# Patient Record
Sex: FEMALE | Race: WHITE | Employment: OTHER | URBAN - METROPOLITAN AREA
[De-identification: names, ages, dates, MRNs, and addresses within clinical notes are randomized per-mention and may not be internally consistent; named-entity substitution may affect disease eponyms.]

---

## 2017-07-19 PROBLEM — B96.81 HELICOBACTER PYLORI GASTRITIS: Status: ACTIVE | Noted: 2017-07-19

## 2017-07-19 PROBLEM — M81.0 OSTEOPOROSIS, UNSPECIFIED OSTEOPOROSIS TYPE, UNSPECIFIED PATHOLOGICAL FRACTURE PRESENCE: Status: ACTIVE | Noted: 2017-07-19

## 2017-07-19 PROBLEM — K29.70 HELICOBACTER PYLORI GASTRITIS: Status: ACTIVE | Noted: 2017-07-19

## 2018-08-08 PROBLEM — Z12.83 SCREENING EXAM FOR SKIN CANCER: Status: ACTIVE | Noted: 2018-08-08

## 2018-08-08 PROBLEM — M81.8 OTHER OSTEOPOROSIS WITHOUT CURRENT PATHOLOGICAL FRACTURE: Status: ACTIVE | Noted: 2017-07-19

## 2018-08-08 PROBLEM — Z83.719 FAMILY HISTORY OF COLONIC POLYPS: Status: ACTIVE | Noted: 2018-08-08

## 2018-08-08 PROBLEM — Z83.71 FAMILY HISTORY OF COLONIC POLYPS: Status: ACTIVE | Noted: 2018-08-08

## 2018-08-08 PROBLEM — E04.1 THYROID NODULE: Status: ACTIVE | Noted: 2018-08-08

## 2018-08-08 PROCEDURE — 87624 HPV HI-RISK TYP POOLED RSLT: CPT | Performed by: INTERNAL MEDICINE

## 2018-08-08 PROCEDURE — 88175 CYTOPATH C/V AUTO FLUID REDO: CPT | Performed by: INTERNAL MEDICINE

## 2018-08-08 PROCEDURE — 86480 TB TEST CELL IMMUN MEASURE: CPT | Performed by: INTERNAL MEDICINE

## 2018-08-08 PROCEDURE — 36415 COLL VENOUS BLD VENIPUNCTURE: CPT | Performed by: INTERNAL MEDICINE

## 2019-06-14 PROBLEM — S92.302A CLOSED NONDISPLACED FRACTURE OF METATARSAL BONE OF LEFT FOOT, UNSPECIFIED METATARSAL, INITIAL ENCOUNTER: Status: ACTIVE | Noted: 2019-06-14

## 2021-05-17 PROCEDURE — 88305 TISSUE EXAM BY PATHOLOGIST: CPT | Performed by: RADIOLOGY

## 2022-02-01 PROBLEM — T83.32XA IUD STRINGS LOST: Status: ACTIVE | Noted: 2022-02-01

## 2024-06-19 RX ORDER — MELATONIN
1000 DAILY
COMMUNITY

## 2024-06-19 RX ORDER — CALCIUM CARBONATE 500 MG/1
1 TABLET, CHEWABLE ORAL DAILY
COMMUNITY

## 2024-06-19 RX ORDER — HYDROCODONE BITARTRATE AND ACETAMINOPHEN 5; 325 MG/1; MG/1
1 TABLET ORAL EVERY 4 HOURS PRN
Qty: 20 TABLET | Refills: 0 | Status: SHIPPED | OUTPATIENT
Start: 2024-06-21

## 2024-06-20 NOTE — DISCHARGE INSTRUCTIONS
My goal is to provide excellent care and a positive patient experience.  Please help me to share your experience with others by leaving a review for me on [email or web address], [email or web address], and/or Liibook.  Thank you for your help.    Go to google.com and search Nancy Whitley.  Click on write a review.  Sign in to google.  (You will not receive any spam or ads from this site and your email will not be posted).  Click on the stars to give a star rating and leave a comment.     Go to [email or web address] and search [employer].  Click on De[employer]r.  Click on the stars to give a star rating and leave a comment.  Enter your email address (You will not receive any spam or ads from this site and your email will not be posted).  Click the box to confirm that you are a patient and post your review.    Go to [email or web address].com and search [employer].  Click on De[employer].  Click on the stars to give a star rating and leave a comment.  Enter your email address (You will not receive any spam or ads from this site and your email will not be posted).  Submit your review.        Nancy Whitley MD      Hand Surgery Post-Operative Instructions    PAIN:  You will be given a prescription for pain medicine.  Have this filled at your local pharmacy or where your insurance plan has made arrangements.  The pills may be taken every four hours for pain if needed.  Do not drive while you are taking the pain medication.    ACTIVITY:  Elevate the surgical area as much as possible for the first three days.  You should bend and straighten your fingers, wrist, elbow, and shoulder often and as much as you desire, unless it is restricted by a splint.  It is safe to write and eat with your operated arm as long as there is no pain.  Do not carry anything heavier than a dinner fork or pencil with your operated arm.    SLING:  A sling may be worn if needed for comfort.  If you had a nerve block, you should use the sling until you regain  sensation and strength to your arm.  It is better to elevate the surgical area above your heart, rather than hanging it below your heart in the sling.    ICE:  Apply ice to the surgical area for 1 hour, 4 times a day for 3 days after surgery.  This will help to reduce swelling and pain.  After that you may apply ice as much as you like.    WOUNDS:  Your stitches may need to be removed in 2 weeks.  The incision may be sore, swell, and develop bruising over the next several days.  This will go away and no special care is required.  Your skin may be dry and peel due to the strong disinfectant soap we use at the time of surgery.    BATHING:   ___x___ You should keep your splint clean and dry, but may loosen the outer ACE wrap if it feels tight.     FOLLOW-UP:  Call the office to make an appointment to see me in about 2 weeks after your surgery.  If you have a temperature over 101°F, severe pain, or redness at the incision site; please contact the office.  You may be asked to come back to the office early.       HOME INSTRUCTIONS  AMBSURG HOME CARE INSTRUCTIONS: POST-OP ANESTHESIA  The medication that you received for sedation or general anesthesia can last up to 24 hours. Your judgment and reflexes may be altered, even if you feel like your normal self.      We Recommend:   Do not drive any motor vehicle or bicycle   Avoid mowing the lawn, playing sports, or working with power tools/applicances (power saws, electric knives or mixers)   That you have someone stay with you on your first night home   Do not drink alcohol or take sleeping pills or tranquilizers   Do not sign legal documents within 24 hours of your procedure   If you had a nerve block for your surgery, take extra care not to put any pressure on your arm or hand for 24 hours    It is normal:  For you to have a sore throat if you had a breathing tube during surgery (while you were asleep!). The sore throat should get better within 48 hours. You can gargle with  warm salt water (1/2 tsp in 4 oz warm water) or use a throat lozenge for comfort  To feel muscle aches or soreness especially in the abdomen, chest or neck. The achy feeling should go away in the next 24 hours  To feel weak, sleepy or \"wiped out\". Your should start feeling better in the next 24 hours.   To experience mild discomforts such as sore lip or tongue, headache, cramps, gas pains or a bloated feeling in your abdomen.   To experience mild back pain or soreness for a day or two if you had spinal or epidural anesthesia.   If you had laparoscopic surgery, to feel shoulder pain or discomfort on the day of surgery.   For some patients to have nausea after surgery/anesthesia    If you feel nausea or experience vomiting:   Try to move around less.   Eat less than usual or drink only liquids until the next morning   Nausea should resolve in about 24 hours    If you have a problem when you are at home:    Call your surgeons office   Discharge Instructions: After Your Surgery  You’ve just had surgery. During surgery, you were given medicine called anesthesia to keep you relaxed and free of pain. After surgery, you may have some pain or nausea. This is common. Here are some tips for feeling better and getting well after surgery.   Going home  Your healthcare provider will show you how to take care of yourself when you go home. They'll also answer your questions. Have an adult family member or friend drive you home. For the first 24 hours after your surgery:   Don't drive or use heavy equipment.  Don't make important decisions or sign legal papers.  Take medicines as directed.  Don't drink alcohol.  Have someone stay with you, if needed. They can watch for problems and help keep you safe.  Be sure to go to all follow-up visits with your healthcare provider. And rest after your surgery for as long as your provider tells you to.   Coping with pain  If you have pain after surgery, pain medicine will help you feel better.  Take it as directed, before pain becomes severe. Also, ask your healthcare provider or pharmacist about other ways to control pain. This might be with heat, ice, or relaxation. And follow any other instructions your surgeon or nurse gives you.      Stay on schedule with your medicine.     Tips for taking pain medicine  To get the best relief possible, remember these points:   Pain medicines can upset your stomach. Taking them with a little food may help.  Most pain relievers taken by mouth need at least 20 to 30 minutes to start to work.  Don't wait till your pain becomes severe before you take your medicine. Try to time your medicine so that you can take it before starting an activity. This might be before you get dressed, go for a walk, or sit down for dinner.  Constipation is a common side effect of some pain medicines. Call your healthcare provider before taking any medicines such as laxatives or stool softeners to help ease constipation. Also ask if you should skip any foods. Drinking lots of fluids and eating foods such as fruits and vegetables that are high in fiber can also help. Remember, don't take laxatives unless your surgeon has prescribed them.  Drinking alcohol and taking pain medicine can cause dizziness and slow your breathing. It can even be deadly. Don't drink alcohol while taking pain medicine.  Pain medicine can make you react more slowly to things. Don't drive or run machinery while taking pain medicine.  Your healthcare provider may tell you to take acetaminophen to help ease your pain. Ask them how much you're supposed to take each day. Acetaminophen or other pain relievers may interact with your prescription medicines or other over-the-counter (OTC) medicines. Some prescription medicines have acetaminophen and other ingredients in them. Using both prescription and OTC acetaminophen for pain can cause you to accidentally overdose. Read the labels on your OTC medicines with care. This will  help you to clearly know the list of ingredients, how much to take, and any warnings. It may also help you not take too much acetaminophen. If you have questions or don't understand the information, ask your pharmacist or healthcare provider to explain it to you before you take the OTC medicine.   Managing nausea  Some people have an upset stomach (nausea) after surgery. This is often because of anesthesia, pain, or pain medicine, less movement of food in the stomach, or the stress of surgery. These tips will help you handle nausea and eat healthy foods as you get better. If you were on a special food plan before surgery, ask your healthcare provider if you should follow it while you get better. Check with your provider on how your eating should progress. It may depend on the surgery you had. These general tips may help:   Don't push yourself to eat. Your body will tell you when to eat and how much.  Start off with clear liquids and soup. They're easier to digest.  Next try semi-solid foods as you feel ready. These include mashed potatoes, applesauce, and gelatin.  Slowly move to solid foods. Don’t eat fatty, rich, or spicy foods at first.  Don't force yourself to have 3 large meals a day. Instead eat smaller amounts more often.  Take pain medicines with a small amount of solid food, such as crackers or toast. This helps prevent nausea.  When to call your healthcare provider  Call your healthcare provider right away if you have any of these:   You still have too much pain, or the pain gets worse, after taking the medicine. The medicine may not be strong enough. Or there may be a complication from the surgery.  You feel too sleepy, dizzy, or groggy. The medicine may be too strong.  Side effects such as nausea or vomiting. Your healthcare provider may advise taking other medicines to .  Skin changes such as rash, itching, or hives. This may mean you have an allergic reaction. Your provider may advise taking other  medicines.  The incision looks different (for instance, part of it opens up).  Bleeding or fluid leaking from the incision site, and weren't told to expect that.  Fever of 100.4°F (38°C) or higher, or as directed by your provider.  Call 911  Call 911 right away if you have:   Trouble breathing  Facial swelling    If you have obstructive sleep apnea   You were given anesthesia medicine during surgery to keep you comfortable and free of pain. After surgery, you may have more apnea spells because of this medicine and other medicines you were given. The spells may last longer than normal.    At home:  Keep using the continuous positive airway pressure (CPAP) device when you sleep. Unless your healthcare provider tells you not to, use it when you sleep, day or night. CPAP is a common device used to treat obstructive sleep apnea.  Talk with your provider before taking any pain medicine, muscle relaxants, or sedatives. Your provider will tell you about the possible dangers of taking these medicines.  Contact your provider if your sleeping changes a lot even when taking medicines as directed.  Kamila last reviewed this educational content on 10/1/2021  © 3411-4988 [employer]. All rights reserved. This information is not intended as a substitute for professional medical care. Always follow your healthcare professional's instructions.

## 2024-06-21 ENCOUNTER — ANESTHESIA (OUTPATIENT)
Dept: SURGERY | Facility: HOSPITAL | Age: 59
End: 2024-06-21

## 2024-06-21 ENCOUNTER — HOSPITAL ENCOUNTER (OUTPATIENT)
Facility: HOSPITAL | Age: 59
Setting detail: HOSPITAL OUTPATIENT SURGERY
Discharge: HOME | End: 2024-06-21
Attending: ORTHOPAEDIC SURGERY | Admitting: ORTHOPAEDIC SURGERY

## 2024-06-21 ENCOUNTER — ANESTHESIA EVENT (OUTPATIENT)
Dept: SURGERY | Facility: HOSPITAL | Age: 59
End: 2024-06-21

## 2024-06-21 VITALS
HEIGHT: 65 IN | RESPIRATION RATE: 12 BRPM | DIASTOLIC BLOOD PRESSURE: 82 MMHG | OXYGEN SATURATION: 98 % | TEMPERATURE: 99 F | WEIGHT: 132 LBS | SYSTOLIC BLOOD PRESSURE: 135 MMHG | BODY MASS INDEX: 21.99 KG/M2 | HEART RATE: 77 BPM

## 2024-06-21 DIAGNOSIS — S52.572A OTHER CLOSED INTRA-ARTICULAR FRACTURE OF DISTAL END OF LEFT RADIUS, INITIAL ENCOUNTER: Primary | ICD-10-CM

## 2024-06-21 PROCEDURE — 0PSJ04Z REPOSITION LEFT RADIUS WITH INTERNAL FIXATION DEVICE, OPEN APPROACH: ICD-10-PCS | Performed by: ORTHOPAEDIC SURGERY

## 2024-06-21 PROCEDURE — 64415 NJX AA&/STRD BRCH PLXS IMG: CPT | Performed by: ORTHOPAEDIC SURGERY

## 2024-06-21 DEVICE — IMPLANTABLE DEVICE: Type: IMPLANTABLE DEVICE | Site: WRIST | Status: FUNCTIONAL

## 2024-06-21 DEVICE — SCREW BNE 3.5X13MM CORT DST RAD TI LOK: Type: IMPLANTABLE DEVICE | Site: WRIST | Status: FUNCTIONAL

## 2024-06-21 DEVICE — SCREW BNE 3.5X11MM CORT DST VOLAR RAD: Type: IMPLANTABLE DEVICE | Site: WRIST | Status: FUNCTIONAL

## 2024-06-21 DEVICE — K WIRE 1.5X127MM DST RAD SYS GEMINUS: Type: IMPLANTABLE DEVICE | Site: WRIST | Status: FUNCTIONAL

## 2024-06-21 DEVICE — SCREW BNE 3.5X10MM DST RAD CORT LOK TI: Type: IMPLANTABLE DEVICE | Site: WRIST | Status: FUNCTIONAL

## 2024-06-21 RX ORDER — SODIUM CHLORIDE, SODIUM LACTATE, POTASSIUM CHLORIDE, CALCIUM CHLORIDE 600; 310; 30; 20 MG/100ML; MG/100ML; MG/100ML; MG/100ML
INJECTION, SOLUTION INTRAVENOUS CONTINUOUS
Status: DISCONTINUED | OUTPATIENT
Start: 2024-06-21 | End: 2024-06-21

## 2024-06-21 RX ORDER — DIPHENHYDRAMINE HYDROCHLORIDE 50 MG/ML
INJECTION INTRAMUSCULAR; INTRAVENOUS AS NEEDED
Status: DISCONTINUED | OUTPATIENT
Start: 2024-06-21 | End: 2024-06-21 | Stop reason: SURG

## 2024-06-21 RX ORDER — CEFAZOLIN SODIUM 1 G/3ML
INJECTION, POWDER, FOR SOLUTION INTRAMUSCULAR; INTRAVENOUS AS NEEDED
Status: DISCONTINUED | OUTPATIENT
Start: 2024-06-21 | End: 2024-06-21 | Stop reason: SURG

## 2024-06-21 RX ORDER — ACETAMINOPHEN 500 MG
1000 TABLET ORAL ONCE
Status: COMPLETED | OUTPATIENT
Start: 2024-06-21 | End: 2024-06-21

## 2024-06-21 RX ORDER — HYDROMORPHONE HYDROCHLORIDE 1 MG/ML
0.2 INJECTION, SOLUTION INTRAMUSCULAR; INTRAVENOUS; SUBCUTANEOUS EVERY 5 MIN PRN
Status: DISCONTINUED | OUTPATIENT
Start: 2024-06-21 | End: 2024-06-21

## 2024-06-21 RX ORDER — NALOXONE HYDROCHLORIDE 0.4 MG/ML
0.08 INJECTION, SOLUTION INTRAMUSCULAR; INTRAVENOUS; SUBCUTANEOUS AS NEEDED
Status: DISCONTINUED | OUTPATIENT
Start: 2024-06-21 | End: 2024-06-21

## 2024-06-21 RX ORDER — MIDAZOLAM HYDROCHLORIDE 1 MG/ML
INJECTION INTRAMUSCULAR; INTRAVENOUS
Status: COMPLETED | OUTPATIENT
Start: 2024-06-21 | End: 2024-06-21

## 2024-06-21 RX ORDER — HYDROMORPHONE HYDROCHLORIDE 1 MG/ML
0.4 INJECTION, SOLUTION INTRAMUSCULAR; INTRAVENOUS; SUBCUTANEOUS EVERY 5 MIN PRN
Status: DISCONTINUED | OUTPATIENT
Start: 2024-06-21 | End: 2024-06-21

## 2024-06-21 RX ORDER — DEXAMETHASONE SODIUM PHOSPHATE 4 MG/ML
VIAL (ML) INJECTION AS NEEDED
Status: DISCONTINUED | OUTPATIENT
Start: 2024-06-21 | End: 2024-06-21 | Stop reason: SURG

## 2024-06-21 RX ORDER — ONDANSETRON 2 MG/ML
INJECTION INTRAMUSCULAR; INTRAVENOUS AS NEEDED
Status: DISCONTINUED | OUTPATIENT
Start: 2024-06-21 | End: 2024-06-21 | Stop reason: SURG

## 2024-06-21 RX ORDER — MORPHINE SULFATE 4 MG/ML
4 INJECTION, SOLUTION INTRAMUSCULAR; INTRAVENOUS EVERY 10 MIN PRN
Status: DISCONTINUED | OUTPATIENT
Start: 2024-06-21 | End: 2024-06-21

## 2024-06-21 RX ORDER — ROPIVACAINE HYDROCHLORIDE 5 MG/ML
INJECTION, SOLUTION EPIDURAL; INFILTRATION; PERINEURAL
Status: COMPLETED | OUTPATIENT
Start: 2024-06-21 | End: 2024-06-21

## 2024-06-21 RX ORDER — MORPHINE SULFATE 4 MG/ML
2 INJECTION, SOLUTION INTRAMUSCULAR; INTRAVENOUS EVERY 10 MIN PRN
Status: DISCONTINUED | OUTPATIENT
Start: 2024-06-21 | End: 2024-06-21

## 2024-06-21 RX ORDER — MORPHINE SULFATE 10 MG/ML
6 INJECTION, SOLUTION INTRAMUSCULAR; INTRAVENOUS EVERY 10 MIN PRN
Status: DISCONTINUED | OUTPATIENT
Start: 2024-06-21 | End: 2024-06-21

## 2024-06-21 RX ORDER — HYDROMORPHONE HYDROCHLORIDE 1 MG/ML
0.6 INJECTION, SOLUTION INTRAMUSCULAR; INTRAVENOUS; SUBCUTANEOUS EVERY 5 MIN PRN
Status: DISCONTINUED | OUTPATIENT
Start: 2024-06-21 | End: 2024-06-21

## 2024-06-21 RX ADMIN — DIPHENHYDRAMINE HYDROCHLORIDE 12.5 MG: 50 INJECTION INTRAMUSCULAR; INTRAVENOUS at 11:35:00

## 2024-06-21 RX ADMIN — CEFAZOLIN SODIUM 2 G: 1 INJECTION, POWDER, FOR SOLUTION INTRAMUSCULAR; INTRAVENOUS at 11:35:00

## 2024-06-21 RX ADMIN — MIDAZOLAM HYDROCHLORIDE 2 MG: 1 INJECTION INTRAMUSCULAR; INTRAVENOUS at 11:24:00

## 2024-06-21 RX ADMIN — DEXAMETHASONE SODIUM PHOSPHATE 8 MG: 4 MG/ML VIAL (ML) INJECTION at 11:53:00

## 2024-06-21 RX ADMIN — ONDANSETRON 4 MG: 2 INJECTION INTRAMUSCULAR; INTRAVENOUS at 11:53:00

## 2024-06-21 RX ADMIN — ROPIVACAINE HYDROCHLORIDE 30 ML: 5 INJECTION, SOLUTION EPIDURAL; INFILTRATION; PERINEURAL at 11:24:00

## 2024-06-21 NOTE — BRIEF OP NOTE
Pre-Operative Diagnosis: Left wrist pain, other closed intra articular fracture of distal end of left radius, initial encounter     Post-Operative Diagnosis: Left wrist pain, other closed intra articular fracture of distal end of left radius, initial encounter      Procedure Performed:   Left distal radius open reduction internal fixation    Surgeons and Role:     * Nancy Whitley MD - Primary    Assistant(s):        Surgical Findings: reduced, stable     Specimen: none     Estimated Blood Loss: minimal    Dictation Number:  #[id number]    Nancy Whitley MD  6/21/2024  1:08 PM

## 2024-06-21 NOTE — ANESTHESIA PREPROCEDURE EVALUATION
Anesthesia PreOp Note    HPI:     Yanci Mauricio is a 58 year old female who presents for preoperative consultation requested by: Nancy Whitley MD    Date of Surgery: 2024    Procedure(s):  Left distal radius open reduction internal fixation  Indication: Left wrist pain, other closed intra articular fracture of distal end of left radius, initial encounter    Relevant Problems   No relevant active problems       NPO:  Last Liquid Consumption Date: 24  Last Liquid Consumption Time:   Last Solid Consumption Date: 24  Last Solid Consumption Time:   Last Liquid Consumption Date: 24          History Review:  Patient Active Problem List    Diagnosis Date Noted    IUD strings lost 2022    Closed nondisplaced fracture of metatarsal bone of left foot, unspecified metatarsal, initial encounter 2019    Family history of colonic polyps 2018    Screening exam for skin cancer 2018    Thyroid nodule 2018    Other osteoporosis without current pathological fracture 2017    Helicobacter pylori gastritis 2017       Past Medical History:    Anxiety    Visual impairment    contacts/glasses       Past Surgical History:   Procedure Laterality Date    Colonoscopy  2021    one small SS polyp, one small polyp with normal tissue, int hem, rpt     Colonoscopy,diagnostic N/A 2016    Procedure: ESOPHAGOGASTRODUODENOSCOPY, COLONOSCOPY, POSSIBLE BIOPSY, POSSIBLE POLYPECTOMY 12864, 78061;  Surgeon: Raf Griggs MD;  Location: [facility]    Needle biopsy left  2021    Breast tissue with stromal fibrosis, apocrine metaplasia, microcysts, and few microcalcifications          Upper gi endoscopy,biopsy N/A 2016    Procedure: ESOPHAGOGASTRODUODENOSCOPY, COLONOSCOPY, POSSIBLE BIOPSY, POSSIBLE POLYPECTOMY 62954, 31917;  Surgeon: Raf Griggs MD;  Location: [facility]       Medications Prior to Admission   Medication Sig  Dispense Refill Last Dose    cholecalciferol 25 MCG (1000 UT) Oral Tab Take 1 tablet (1,000 Units total) by mouth daily.   6/20/2024 at 0800    calcium carbonate 500 MG Oral Chew Tab Chew 1 tablet (500 mg total) by mouth daily.   6/20/2024 at 0800    Escitalopram Oxalate (LEXAPRO) 10 MG Oral Tab Take 1 tablet (10 mg total) by mouth daily.  3 6/20/2024 at 0800    QUEtiapine Fumarate (SEROQUEL) 25 MG Oral Tab Take 2 tablets (50 mg total) by mouth nightly.  3 6/20/2024 at 2100     Current Facility-Administered Medications Ordered in Epic   Medication Dose Route Frequency Provider Last Rate Last Admin    lactated ringers infusion   Intravenous Continuous Nancy Whitley MD 20 mL/hr at 06/21/24 1011 New Bag at 06/21/24 1011    ceFAZolin (Ancef) 2g in 10mL IV syringe premix  2 g Intravenous Once Nancy Whitley MD         Current Outpatient Medications Ordered in Epic   Medication Sig Dispense Refill    HYDROcodone-acetaminophen 5-325 MG Oral Tab Take 1 tablet by mouth every 4 (four) hours as needed. 20 tablet 0       No Known Allergies    Family History   Problem Relation Age of Onset    Hypertension Father     Cancer Father         prostate cancer    Heart Disorder Mother         afib    Other (Other) Mother         psoriatic arthritis    Hypertension Maternal Grandfather     Other (Other) Maternal Grandfather         renal failure    Heart Disorder Paternal Grandmother     Cancer Paternal Grandfather         prostate    Cancer Sister         colon polyps     Social History     Socioeconomic History    Marital status:    Tobacco Use    Smoking status: Never    Smokeless tobacco: Never   Vaping Use    Vaping status: Never Used   Substance and Sexual Activity    Alcohol use: Yes     Comment: once per week    Drug use: No     Comment: former opiod addiction (prescription pain meds)    Sexual activity: Yes     Partners: Male       Available pre-op labs reviewed.             Vital Signs:  Body mass index is 21.97  kg/m².   height is 1.651 m (5' 5\") and weight is 59.9 kg (132 lb). Her oral temperature is 98.2 °F (36.8 °C). Her blood pressure is 151/89 and her pulse is 88. Her respiration is 18 and oxygen saturation is 98%.   Vitals:    06/19/24 0807 06/21/24 1008   BP:  151/89   Pulse:  88   Resp:  18   Temp:  98.2 °F (36.8 °C)   TempSrc:  Oral   SpO2:  98%   Weight: 59 kg (130 lb) 59.9 kg (132 lb)   Height: 1.651 m (5' 5\") 1.651 m (5' 5\")        Anesthesia Evaluation     Patient summary reviewed and Nursing notes reviewed    Airway   Mallampati: II  Dental - Dentition appears grossly intact     Pulmonary - negative ROS and normal exam   Cardiovascular - negative ROS and normal exam  Exercise tolerance: good    Neuro/Psych - negative ROS     GI/Hepatic/Renal - negative ROS     Endo/Other - negative ROS   Abdominal  - normal exam                 Anesthesia Plan:   ASA:  2  Plan:   General and regional  Airway:  LMA  Informed Consent Plan and Risks Discussed With:  Patient      I have informed Yanci Mauricio and/or legal guardian or family member of the nature of the anesthetic plan, benefits, risks including possible dental damage if relevant, major complications, and any alternative forms of anesthetic management.   All of the patient's questions were answered to the best of my ability. The patient desires the anesthetic management as planned.  Ayanna Landers MD, PhD  6/21/2024 11:30 AM  Present on Admission:  **None**

## 2024-06-21 NOTE — ANESTHESIA POSTPROCEDURE EVALUATION
Patient: Yanci Mauricio    Procedure Summary       Date: 06/21/24 Room / Location: [facility] MAIN OR 04 / [facility] MAIN OR    Anesthesia Start: 1135 Anesthesia Stop: 1312    Procedure: Left distal radius open reduction internal fixation (Left: Wrist) Diagnosis: (Left wrist pain, other closed intra articular fracture of distal end of left radius, initial encounter)    Surgeons: Nancy Whitley MD Anesthesiologist: Ayanna Landers MD, PhD    Anesthesia Type: general, regional ASA Status: 2            Anesthesia Type: No value filed.    Vitals Value Taken Time   /125 06/21/24 1310   Temp 98.0 06/21/24 1312   Pulse 77 06/21/24 1311   Resp 19 06/21/24 1311   SpO2 92 % 06/21/24 1311   Vitals shown include unfiled device data.    [facility] AN Post Evaluation:   Patient Evaluated in PACU  Patient Participation: complete - patient participated  Level of Consciousness: awake and alert  Pain Score: 2  Pain Management: adequate  Airway Patency:patent  Yes    Nausea/Vomiting: none  Cardiovascular Status: acceptable  Respiratory Status: acceptable  Postoperative Hydration acceptable      Ayanna Landers MD, PhD  6/21/2024 1:12 PM

## 2024-06-21 NOTE — H&P
Yanci Mauricio  9/17/1965  58 year old   female  Nancy Whitley MD    HPI:   Patient presents with:  Left Wrist - Pain, Fracture    The patient is right-handed.  Date of injury/ onset of symptoms: 6/16/2024 Patient was walking her dogs when the leash got tangled around her legs and she fell onto her left outstretched hand.  The patient complains of pain located in her left wrist. The pain is graded as severe.  The patient's pain occurs with use.  The patient denies numbness and tingling.  The patient denies skin laceration or breakdown.  Prior treatments have included a reduction and sugar tong splint with partial reduction of the fracture    ALLERGIES:  No Known Allergies   Current Outpatient Medications   Medication Sig Dispense Refill   propranolol 10 MG Oral Tab Take 1 tablet (10 mg total) by mouth every 6 (six) hours as needed. 30 tablet 0   traZODone 50 MG Oral Tab Take 1 tablet (50 mg total) by mouth nightly as needed for Sleep. 30 tablet 0   alendronate 70 MG Oral Tab Take 1 tablet (70 mg total) by mouth every 7 days. 12 tablet 3   Estradiol 10 MCG Vaginal Tab Place 1 tablet vaginally twice weekly 24 tablet 2   Ergocalciferol (VITAMIN D OR) Take by mouth.   gabapentin 100 MG Oral Cap Take 100-200 mg by mouth nightly as needed. AT BEDTIME   Escitalopram Oxalate (LEXAPRO) 10 MG Oral Tab Take 5 mg by mouth daily. 3   QUEtiapine Fumarate (SEROQUEL) 25 MG Oral Tab Take 25 mg by mouth 2 (two) times daily. 3     HISTORY:  Past Medical History:   Diagnosis Date   Anxiety     Past Surgical History:   Procedure Laterality Date   COLONOSCOPY 09/2021   one small SS polyp, one small polyp with normal tissue, int hem, rpt 2026   COLONOSCOPY,DIAGNOSTIC N/A 1/6/2016   Procedure: ESOPHAGOGASTRODUODENOSCOPY, COLONOSCOPY, POSSIBLE BIOPSY, POSSIBLE POLYPECTOMY 13617, 98105; Surgeon: Raf Griggs MD; Location: [facility] [place] [place] SURGICAL [facility]   NEEDLE BIOPSY LEFT 05/17/2021   Breast tissue with stromal fibrosis, apocrine  metaplasia, microcysts, and few microcalcifications      UPPER GI ENDOSCOPY,BIOPSY N/A 2016   Procedure: ESOPHAGOGASTRODUODENOSCOPY, COLONOSCOPY, POSSIBLE BIOPSY, POSSIBLE POLYPECTOMY 02162, 37042; Surgeon: Raf Griggs MD; Location: [facility] [place] [facility] [place]     Family History   Problem Relation Age of Onset   Heart Disorder Mother   afib   Other (Other) Mother   psoriatic arthritis   Hypertension Father   Cancer Father 65   prostate cancer   Colon Polyps Sister   Hypertension Maternal Grandfather   Other (Other) Maternal Grandfather   renal failure   Heart Disorder Paternal Grandmother   Cancer Paternal Grandfather   prostate     Social History  Tobacco Use  Smoking status: Never  Smokeless tobacco: Never  Vaping Use  Vaping status: Never Used  Alcohol use: No  Drug use: No  Comment: former opiod addiction (prescription pain meds)        REVIEW OF SYSTEMS:   A 12 point review of systems was performed as documented on the intake form and reviewed by me today with pertinent positives and negatives listed in the HPI.    EXAM:   Ht 5' 5\" (1.651 m)  Wt 139 lb (63 kg)  LMP 10/01/2020  BMI 23.13 kg/m²   The patient is awake and oriented x 3 and overall well appearing. The patient has normal mood.  The patient is non-tender and atraumatic with the exception of their left upper extremity.  The patient's skin is intact and compartments are soft.  The patient's upper extremities are warm and pink with brisk capillary refill and 2+ radial pulses.  Sensation is intact to light touch in axillary, median, ulnar, and radial nerve distributions.  The patient has 5/5 strength in finger flexion, finger extension, EPL, FPL, and interosseous muscle function.  The patient has tenderness to palpation at the left distal radius with associated edema and ecchymosis. There is evidence of clinical deformity.  She has limited motion.    IMAGING AND TESTING:  Xrays and CT of her left wrist independently reviewed by me today reveals a  comminuted distal radius fracture with intra-articular extension.    ASSESSMENT AND PLAN:   Left wrist pain (primary encounter diagnosis)  Other closed intra-articular fracture of distal end of left radius, initial encounter    The risks, indications, benefits, and procedures of both operative and non-operative treatment were discussed with the patient.    The patient desired surgery. Surgery recommended was left distal radius open reduction internal fixation. Risks include bleeding, infection, neurovascular injury, failure of the procedure, stiffness, and potential need for additional surgery as well as anesthetic complications including death. Risks of fracture care include malunion, nonunion, delayed union, and post-traumatic arthritis.  The patient consented to the procedure.     All of their questions were answered and they are in agreement with the treatment plan.    The patient will return to clinic in 2 weeks postop for further clinical and radiographic evaluation with 3 views of her left wrist.

## 2024-06-21 NOTE — ANESTHESIA PROCEDURE NOTES
Peripheral Block    Date/Time: 6/21/2024 11:24 AM    Performed by: Ayanna Landers MD, PhD  Authorized by: Ayanna Landers MD, PhD      General Information and Staff    Start Time:  6/21/2024 11:24 AM  End Time:  6/21/2024 11:28 AM  Anesthesiologist:  Ayanna Landers MD, PhD  Performed by:  Anesthesiologist  Patient Location:  PACU      Site Identification: real time ultrasound guided and image stored and retrievable    Block site/laterality marked before start: site marked  Reason for Block: at surgeon's request and post-op pain management    Preanesthetic Checklist: 2 patient identifers, IV checked, risks and benefits discussed, monitors and equipment checked, pre-op evaluation, timeout performed, anesthesia consent, sterile technique used, no prohibitive neurological deficits and no local skin infection at insertion site      Procedure Details    Patient Position:   Prep: ChloraPrep    Monitoring:  Cardiac monitor, continuous pulse ox and blood pressure cuff  Block Type:  Supraclavicular  Injection Technique:  Single-shot    Needle    Needle Type:  Short-bevel and echogenic  Needle Length:  50 mm  Needle Localization:  Ultrasound guidance  Reason for Ultrasound Use: appropriate spread of the medication was noted in real time and no ultrasound evidence of intravascular and/or intraneural injection            Assessment    Injection Assessment:  Good spread noted, negative resistance, negative aspiration for heme, incremental injection, low pressure and local visualized surrounding nerve on ultrasound  Heart Rate Change: No    - Patient tolerated block procedure well without evidence of immediate block related complications.     Medications  6/21/2024 11:24 AM  midazolam (VERSED)injection 2mg/2ml - Intravenous   2 mg - 6/21/2024 11:24:00 AM  ropivacaine (NAROPIN) injection 0.5% - Infiltration   30 mL - 6/21/2024 11:24:00 AM    Additional Comments    Medication:  Ropivacaine 0.5% 30mL

## 2024-06-22 NOTE — OPERATIVE REPORT
[facility]    PATIENT'S NAME: ELIJAH REHMAN   ATTENDING PHYSICIAN: Nancy Whitley MD   OPERATING PHYSICIAN: Nancy Whitley MD   PATIENT ACCOUNT#:   [id number]    LOCATION:  [facility]  MEDICAL RECORD #:   [id number]       YOB: 1965  ADMISSION DATE:       06/21/2024      OPERATION DATE:  06/21/2024    OPERATIVE REPORT      PREOPERATIVE DIAGNOSIS:  Left closed, displaced intra-articular distal radius fracture.  POSTOPERATIVE DIAGNOSIS:  Left closed, displaced intra-articular distal radius fracture.  PROCEDURE:    1.   Left distal radius open reduction and internal fixation.  2.   Brachioradialis tenotomy, necessary for fracture reduction.    ANESTHESIA:  General and a block.     BLOOD LOSS:  Minimal.    COMPLICATIONS:  None.    CONDITION:  The patient was aroused from anesthesia and transferred to recovery room in stable condition.    INSTRUMENTATION UTILIZED:  [employer] volar distal radial locking plate.    INDICATIONS:  The patient is a 58-year-old female who based on history, physical examination, and imaging studies was diagnosed as having a left closed, displaced intra-articular distal radius fracture.  The risks, indications, and benefits of procedure of both operative and nonoperative treatment were thoroughly described to the patient.  The patient desired surgery.  Surgery recommended was a left distal radius open reduction and internal fixation and associated procedures.  Risks include bleeding, infection, neurovascular injury, chronic pain, chronic disability, failure of the procedure, stiffness, malunion, nonunion, delayed union, posttraumatic arthritis, potential need for additional surgery, as well as anesthetic complications including death.  The patient consented to the procedure.  All of her questions were answered.  She was in agreement with the treatment plan.     OPERATIVE TECHNIQUE:  On 06/21/2024, the patient was seen and evaluated  preoperatively.  Her left wrist was identified as the correct operative site.  My initials were placed.  She was transferred to the operating room and transferred to the operating table in supine position.  Sedation was induced.  Preoperatively, she received a block.  She was given Ancef as antibiotic prophylaxis within 1 hour of incision time.  Her left upper extremity was prepped and draped in a sterile fashion.  She had a longitudinal incision over FCR tendon with blunt dissection to the FCR tendon sheath.  The pronator quadratus was released both distally and radially and retracted at the level of the DRUJ.  The fracture was mobilized.  The brachioradialis tenotomy was necessary for fracture reduction secondary to comminution of the metaphysis and the fracture did have intra-articular extension with mainly 2 articular fragments that were reduced and held with provisional fixation.  Fluoroscopy with anterior, posterior, oblique, and lateral x-ray views confirmed reduction of the fracture, and then a [employer] volar distal radial locking plate was placed distally, locked distally and lagged to the bone proximally to fully restore volar tilt; thus, reducing height, inclination, and volar tilt.  The patient had additional locking screws placed both proximally and distally.  Fluoroscopy with anterior, posterior, oblique, and lateral x-ray views confirmed reduction of the fracture, stability of the fracture, and proper length and position of the plate and screws.  The DRUJ was stable.  The wound was thoroughly and copiously irrigated.  Pronator quadratus could be reapproximated with 2-0 Vicryl suture and then skin was closed with 4-0 Prolene in horizontal mattress fashion.  A large sterile bulky soft dressing was placed.  Tourniquet was deflated following wound closure.  Blood loss was minimal.  Complications were none.  She had volar wrist splint placed.  She was aroused from anesthesia and transferred to the  recovery room in stable condition.    DISPOSITION:  She was discharged to home in stable condition with a pain prescription, written instructions, and 24-hour access to emergency number.  She will return to clinic in 10 to 14 days for repeat clinical and radiographic evaluation.  All of her questions were answered.  She was in agreement with the treatment plan.    Dictated By Nancy Whitley MD  d: 06/21/2024 13:14:07  t: 06/21/2024 21:33:56  [facility] [id number]  JEL/

## (undated) DEVICE — INTENDED FOR TISSUE SEPARATION, AND OTHER PROCEDURES THAT REQUIRE A SHARP SURGICAL BLADE TO PUNCTURE OR CUT.: Brand: BARD-PARKER ® STAINLESS STEEL BLADES

## (undated) DEVICE — BNDG,ELSTC,MATRIX,STRL,4"X5YD,LF,HOOK&LP: Brand: MEDLINE

## (undated) DEVICE — STERILE TETRA-FLEX CF, ELASTIC BANDAGE, 2" X 5.5YD: Brand: TETRA-FLEX™CF

## (undated) DEVICE — SOLUTION IRRIG 1000ML 0.9% NACL USP BTL

## (undated) DEVICE — GUIDE SUR DIA1.5MM AIMING FOR GEMINUS VOLAR

## (undated) DEVICE — OCCLUSIVE GAUZE STRIP,3% BISMUTH TRIBROMOPHENATE IN PETROLATUM BLEND: Brand: XEROFORM

## (undated) DEVICE — C-ARM: Brand: UNBRANDED

## (undated) DEVICE — ENCORE® LATEX MICRO SIZE 6.5, STERILE LATEX POWDER-FREE SURGICAL GLOVE: Brand: ENCORE

## (undated) DEVICE — DISPOSABLE TOURNIQUET CUFF DUAL BLADDER, DUAL PORT AND QUICK CONNECT CONNECTOR: Brand: COLOR CUFF

## (undated) DEVICE — UPPER EXTREMITY: Brand: MEDLINE INDUSTRIES, INC.

## (undated) DEVICE — PADDING,UNDERCAST,COTTON, 3X4YD STERILE: Brand: MEDLINE

## (undated) DEVICE — BIT DRL 2.5X40MM DORS SPANNING PLT DST

## (undated) DEVICE — BIT DRL 2X40MM SLD SIDE CUT FOR GEMINUS

## (undated) DEVICE — SLING ORTH L16.5IN D7IN M DK BLU POLY COT ARM

## (undated) DEVICE — GAMMEX® PI HYBRID SIZE 7, STERILE POWDER-FREE SURGICAL GLOVE, POLYISOPRENE AND NEOPRENE BLEND: Brand: GAMMEX

## (undated) DEVICE — BNDG,ELSTC,MATRIX,STRL,3"X5YD,LF,HOOK&LP: Brand: MEDLINE

## (undated) DEVICE — 3M™ IOBAN™ 2 ANTIMICROBIAL INCISE DRAPE 6640EZ: Brand: IOBAN™ 2

## (undated) DEVICE — UNDYED BRAIDED (POLYGLACTIN 910), SYNTHETIC ABSORBABLE SUTURE: Brand: COATED VICRYL

## (undated) DEVICE — COTTON UNDERCAST PADDING,REGULAR FINISH: Brand: WEBRIL

## (undated) DEVICE — WEBRIL COTTON UNDERCAST PADDING: Brand: WEBRIL

## (undated) DEVICE — SUT PROL 4-0 18IN PS-2 NABSRB BLU L19MM 3/8 C

## (undated) DEVICE — CABLE BPLR L12FT FLYING LD DISP